# Patient Record
Sex: MALE
[De-identification: names, ages, dates, MRNs, and addresses within clinical notes are randomized per-mention and may not be internally consistent; named-entity substitution may affect disease eponyms.]

---

## 2019-10-27 ENCOUNTER — HOSPITAL (OUTPATIENT)
Dept: OTHER | Age: 64
End: 2019-10-27

## 2019-10-27 PROCEDURE — 99223 1ST HOSP IP/OBS HIGH 75: CPT | Performed by: INTERNAL MEDICINE

## 2019-10-27 PROCEDURE — X1094 NO CHARGE VISIT: HCPCS | Performed by: INTERNAL MEDICINE

## 2019-10-28 PROCEDURE — 99233 SBSQ HOSP IP/OBS HIGH 50: CPT | Performed by: INTERNAL MEDICINE

## 2019-10-29 PROCEDURE — 99233 SBSQ HOSP IP/OBS HIGH 50: CPT | Performed by: INTERNAL MEDICINE

## 2019-10-30 PROCEDURE — 99233 SBSQ HOSP IP/OBS HIGH 50: CPT | Performed by: INTERNAL MEDICINE

## 2019-10-31 PROCEDURE — 99222 1ST HOSP IP/OBS MODERATE 55: CPT | Performed by: FAMILY MEDICINE

## 2019-10-31 PROCEDURE — 99233 SBSQ HOSP IP/OBS HIGH 50: CPT | Performed by: INTERNAL MEDICINE

## 2019-11-01 PROCEDURE — 99232 SBSQ HOSP IP/OBS MODERATE 35: CPT | Performed by: INTERNAL MEDICINE

## 2019-11-01 PROCEDURE — 99497 ADVNCD CARE PLAN 30 MIN: CPT | Performed by: FAMILY MEDICINE

## 2019-11-01 PROCEDURE — 99233 SBSQ HOSP IP/OBS HIGH 50: CPT | Performed by: FAMILY MEDICINE

## 2019-11-02 PROCEDURE — 99232 SBSQ HOSP IP/OBS MODERATE 35: CPT | Performed by: INTERNAL MEDICINE

## 2019-11-03 PROCEDURE — 99232 SBSQ HOSP IP/OBS MODERATE 35: CPT | Performed by: INTERNAL MEDICINE

## 2019-11-04 PROCEDURE — 99232 SBSQ HOSP IP/OBS MODERATE 35: CPT | Performed by: INTERNAL MEDICINE

## 2019-11-04 PROCEDURE — 99233 SBSQ HOSP IP/OBS HIGH 50: CPT | Performed by: FAMILY MEDICINE

## 2019-11-05 PROCEDURE — 99232 SBSQ HOSP IP/OBS MODERATE 35: CPT | Performed by: INTERNAL MEDICINE

## 2019-11-06 PROCEDURE — 99233 SBSQ HOSP IP/OBS HIGH 50: CPT | Performed by: FAMILY MEDICINE

## 2019-11-06 PROCEDURE — 99239 HOSP IP/OBS DSCHRG MGMT >30: CPT | Performed by: INTERNAL MEDICINE

## 2020-04-08 ENCOUNTER — APPOINTMENT (OUTPATIENT)
Dept: GENERAL RADIOLOGY | Facility: HOSPITAL | Age: 65
End: 2020-04-08
Payer: MEDICAID

## 2020-04-08 ENCOUNTER — HOSPITAL ENCOUNTER (EMERGENCY)
Facility: HOSPITAL | Age: 65
Discharge: HOME | End: 2020-04-08
Attending: EMERGENCY MEDICINE
Payer: MEDICAID

## 2020-04-08 VITALS
WEIGHT: 170 LBS | HEART RATE: 65 BPM | RESPIRATION RATE: 17 BRPM | DIASTOLIC BLOOD PRESSURE: 95 MMHG | TEMPERATURE: 99 F | SYSTOLIC BLOOD PRESSURE: 144 MMHG | OXYGEN SATURATION: 96 %

## 2020-04-08 DIAGNOSIS — Z20.822 SUSPECTED COVID-19 VIRUS INFECTION: Primary | ICD-10-CM

## 2020-04-08 PROCEDURE — 85025 COMPLETE CBC W/AUTO DIFF WBC: CPT

## 2020-04-08 PROCEDURE — 80048 BASIC METABOLIC PNL TOTAL CA: CPT | Performed by: EMERGENCY MEDICINE

## 2020-04-08 PROCEDURE — 85025 COMPLETE CBC W/AUTO DIFF WBC: CPT | Performed by: EMERGENCY MEDICINE

## 2020-04-08 PROCEDURE — 99283 EMERGENCY DEPT VISIT LOW MDM: CPT

## 2020-04-08 PROCEDURE — 36415 COLL VENOUS BLD VENIPUNCTURE: CPT

## 2020-04-08 PROCEDURE — 71045 X-RAY EXAM CHEST 1 VIEW: CPT

## 2020-04-08 PROCEDURE — 80048 BASIC METABOLIC PNL TOTAL CA: CPT

## 2020-04-08 RX ORDER — SENNA AND DOCUSATE SODIUM 50; 8.6 MG/1; MG/1
1 TABLET, FILM COATED ORAL DAILY
COMMUNITY

## 2020-04-08 RX ORDER — DONEPEZIL HYDROCHLORIDE 10 MG/1
10 TABLET, FILM COATED ORAL NIGHTLY
COMMUNITY

## 2020-04-08 RX ORDER — AMIODARONE HYDROCHLORIDE 200 MG/1
200 TABLET ORAL DAILY
COMMUNITY

## 2020-04-08 RX ORDER — FAMOTIDINE 20 MG/1
20 TABLET ORAL 2 TIMES DAILY
COMMUNITY

## 2020-04-08 NOTE — ED PROVIDER NOTES
Patient Seen in: [facility] AND [facility] Emergency Department      History   Patient presents with:  Cough/URI    Stated Complaint:     HPI    58 yo male from nursing home with cough. The patient has a h/o dementia and is unable to give much history.  No chest Cardiovascular:      Rate and Rhythm: Normal rate and regular rhythm. Pulmonary:      Effort: Pulmonary effort is normal. No respiratory distress. Lymphadenopathy:      Cervical: No cervical adenopathy. Skin:     General: Skin is warm and dry. Disposition and Plan     Clinical Impression:  Suspected COVID-19 virus infection  (primary encounter diagnosis)    Disposition:  Discharge  4/8/2020  4:52 am    Follow-up:  No follow-up provider specified.       Medications Prescribed:  Dorothea Sidhu

## 2020-04-08 NOTE — ED INITIAL ASSESSMENT (HPI)
Pt presents to ED via [facility] for a cough for several days. Pt is from [facility]. Pt A&O to self which is baseline per medics. Pt has a strong wet cough. Pt does not appear to be in any apparent distress. Pt wearing 2L O2 NC and sating at 98%.  Per [employer]

## 2023-01-01 ENCOUNTER — APPOINTMENT (OUTPATIENT)
Dept: GENERAL RADIOLOGY | Facility: HOSPITAL | Age: 68
End: 2023-01-01
Attending: INTERNAL MEDICINE
Payer: MEDICAID

## 2023-01-01 ENCOUNTER — HOSPITAL ENCOUNTER (INPATIENT)
Facility: HOSPITAL | Age: 68
LOS: 2 days | End: 2023-01-01
Attending: EMERGENCY MEDICINE | Admitting: HOSPITALIST
Payer: MEDICAID

## 2023-01-01 ENCOUNTER — APPOINTMENT (OUTPATIENT)
Dept: GENERAL RADIOLOGY | Facility: HOSPITAL | Age: 68
End: 2023-01-01
Attending: HOSPITALIST
Payer: MEDICAID

## 2023-01-01 ENCOUNTER — APPOINTMENT (OUTPATIENT)
Dept: CT IMAGING | Facility: HOSPITAL | Age: 68
End: 2023-01-01
Attending: EMERGENCY MEDICINE
Payer: MEDICAID

## 2023-01-01 ENCOUNTER — APPOINTMENT (OUTPATIENT)
Dept: NUCLEAR MEDICINE | Facility: HOSPITAL | Age: 68
End: 2023-01-01
Attending: INTERNAL MEDICINE
Payer: MEDICAID

## 2023-01-01 ENCOUNTER — ANESTHESIA (OUTPATIENT)
Dept: CARDIOLOGY UNIT | Facility: HOSPITAL | Age: 68
End: 2023-01-01
Payer: MEDICAID

## 2023-01-01 ENCOUNTER — APPOINTMENT (OUTPATIENT)
Dept: ULTRASOUND IMAGING | Facility: HOSPITAL | Age: 68
End: 2023-01-01
Attending: INTERNAL MEDICINE
Payer: MEDICAID

## 2023-01-01 ENCOUNTER — APPOINTMENT (OUTPATIENT)
Dept: GENERAL RADIOLOGY | Facility: HOSPITAL | Age: 68
End: 2023-01-01
Payer: MEDICAID

## 2023-01-01 ENCOUNTER — APPOINTMENT (OUTPATIENT)
Dept: CT IMAGING | Facility: HOSPITAL | Age: 68
End: 2023-01-01
Attending: HOSPITALIST
Payer: MEDICAID

## 2023-01-01 ENCOUNTER — ANESTHESIA EVENT (OUTPATIENT)
Dept: CARDIOLOGY UNIT | Facility: HOSPITAL | Age: 68
End: 2023-01-01
Payer: MEDICAID

## 2023-01-01 VITALS
SYSTOLIC BLOOD PRESSURE: 76 MMHG | WEIGHT: 166 LBS | RESPIRATION RATE: 18 BRPM | HEIGHT: 69 IN | HEART RATE: 63 BPM | BODY MASS INDEX: 24.59 KG/M2 | DIASTOLIC BLOOD PRESSURE: 65 MMHG | OXYGEN SATURATION: 93 % | TEMPERATURE: 98 F

## 2023-01-01 DIAGNOSIS — R06.02 SOB (SHORTNESS OF BREATH): Primary | ICD-10-CM

## 2023-01-01 LAB
ALBUMIN SERPL-MCNC: 2.7 G/DL (ref 3.4–5)
ALBUMIN SERPL-MCNC: 3.5 G/DL (ref 3.4–5)
ALBUMIN/GLOB SERPL: 0.6 {RATIO} (ref 1–2)
ALBUMIN/GLOB SERPL: 0.6 {RATIO} (ref 1–2)
ALP LIVER SERPL-CCNC: 112 U/L
ALP LIVER SERPL-CCNC: 89 U/L
ALT SERPL-CCNC: 59 U/L
ALT SERPL-CCNC: 81 U/L
ANION GAP SERPL CALC-SCNC: 10 MMOL/L (ref 0–18)
ANION GAP SERPL CALC-SCNC: 4 MMOL/L (ref 0–18)
ANION GAP SERPL CALC-SCNC: 8 MMOL/L (ref 0–18)
AST SERPL-CCNC: 144 U/L (ref 15–37)
AST SERPL-CCNC: 87 U/L (ref 15–37)
ATRIAL RATE: 102 BPM
ATRIAL RATE: 110 BPM
BASE EXCESS BLD CALC-SCNC: -3.2 MMOL/L (ref ?–2)
BASE EXCESS BLD CALC-SCNC: -3.5 MMOL/L (ref ?–2)
BASOPHILS # BLD: 0 X10(3) UL (ref 0–0.2)
BASOPHILS NFR BLD: 0 %
BILIRUB SERPL-MCNC: 0.4 MG/DL (ref 0.1–2)
BILIRUB SERPL-MCNC: 0.6 MG/DL (ref 0.1–2)
BILIRUB UR QL: NEGATIVE
BUN BLD-MCNC: 90 MG/DL (ref 7–18)
BUN BLD-MCNC: 93 MG/DL (ref 7–18)
BUN BLD-MCNC: 97 MG/DL (ref 7–18)
BUN/CREAT SERPL: 18.5 (ref 10–20)
BUN/CREAT SERPL: 32.1 (ref 10–20)
BUN/CREAT SERPL: 46 (ref 10–20)
CALCIUM BLD-MCNC: 8.8 MG/DL (ref 8.5–10.1)
CALCIUM BLD-MCNC: 9 MG/DL (ref 8.5–10.1)
CALCIUM BLD-MCNC: 9.5 MG/DL (ref 8.5–10.1)
CHLORIDE SERPL-SCNC: 104 MMOL/L (ref 98–112)
CHLORIDE SERPL-SCNC: 115 MMOL/L (ref 98–112)
CHLORIDE SERPL-SCNC: 120 MMOL/L (ref 98–112)
CHOLEST SERPL-MCNC: 165 MG/DL (ref ?–200)
CO2 SERPL-SCNC: 21 MMOL/L (ref 21–32)
CO2 SERPL-SCNC: 22 MMOL/L (ref 21–32)
CO2 SERPL-SCNC: 23 MMOL/L (ref 21–32)
COLOR UR: YELLOW
CREAT BLD-MCNC: 2.02 MG/DL
CREAT BLD-MCNC: 3.02 MG/DL
CREAT BLD-MCNC: 4.87 MG/DL
CRP SERPL-MCNC: 12.9 MG/DL (ref ?–0.3)
CRP SERPL-MCNC: 18.5 MG/DL (ref ?–0.3)
D DIMER PPP FEU-MCNC: 13.78 UG/ML FEU (ref ?–0.67)
D DIMER PPP FEU-MCNC: >20 UG/ML FEU (ref ?–0.67)
DEPRECATED HBV CORE AB SER IA-ACNC: 242.9 NG/ML
DEPRECATED HBV CORE AB SER IA-ACNC: 260.2 NG/ML
DEPRECATED RDW RBC AUTO: 58.9 FL (ref 35.1–46.3)
DEPRECATED RDW RBC AUTO: 59.7 FL (ref 35.1–46.3)
DEPRECATED RDW RBC AUTO: 61.5 FL (ref 35.1–46.3)
EOSINOPHIL # BLD: 0 X10(3) UL (ref 0–0.7)
EOSINOPHIL NFR BLD: 0 %
ERYTHROCYTE [DISTWIDTH] IN BLOOD BY AUTOMATED COUNT: 18.6 % (ref 11–15)
ERYTHROCYTE [DISTWIDTH] IN BLOOD BY AUTOMATED COUNT: 18.6 % (ref 11–15)
ERYTHROCYTE [DISTWIDTH] IN BLOOD BY AUTOMATED COUNT: 18.8 % (ref 11–15)
FLUAV + FLUBV RNA SPEC NAA+PROBE: NEGATIVE
FLUAV + FLUBV RNA SPEC NAA+PROBE: NEGATIVE
GFR SERPLBLD BASED ON 1.73 SQ M-ARVRAT: 12 ML/MIN/1.73M2 (ref 60–?)
GFR SERPLBLD BASED ON 1.73 SQ M-ARVRAT: 22 ML/MIN/1.73M2 (ref 60–?)
GFR SERPLBLD BASED ON 1.73 SQ M-ARVRAT: 35 ML/MIN/1.73M2 (ref 60–?)
GLOBULIN PLAS-MCNC: 4.8 G/DL (ref 2.8–4.4)
GLOBULIN PLAS-MCNC: 5.5 G/DL (ref 2.8–4.4)
GLUCOSE BLD-MCNC: 107 MG/DL (ref 70–99)
GLUCOSE BLD-MCNC: 109 MG/DL (ref 70–99)
GLUCOSE BLD-MCNC: 126 MG/DL (ref 70–99)
GLUCOSE BLDC GLUCOMTR-MCNC: 105 MG/DL (ref 70–99)
GLUCOSE BLDC GLUCOMTR-MCNC: 121 MG/DL (ref 70–99)
GLUCOSE BLDC GLUCOMTR-MCNC: 124 MG/DL (ref 70–99)
GLUCOSE BLDC GLUCOMTR-MCNC: 128 MG/DL (ref 70–99)
GLUCOSE BLDC GLUCOMTR-MCNC: 129 MG/DL (ref 70–99)
GLUCOSE BLDC GLUCOMTR-MCNC: 154 MG/DL (ref 70–99)
GLUCOSE BLDC GLUCOMTR-MCNC: 157 MG/DL (ref 70–99)
GLUCOSE BLDC GLUCOMTR-MCNC: 95 MG/DL (ref 70–99)
GLUCOSE UR-MCNC: NORMAL MG/DL
HCO3 BLDA-SCNC: 22 MEQ/L (ref 21–27)
HCO3 BLDA-SCNC: 22.4 MEQ/L (ref 21–27)
HCT VFR BLD AUTO: 34.6 %
HCT VFR BLD AUTO: 37.8 %
HCT VFR BLD AUTO: 43.9 %
HDLC SERPL-MCNC: 49 MG/DL (ref 40–59)
HGB BLD-MCNC: 10.6 G/DL
HGB BLD-MCNC: 11.9 G/DL
HGB BLD-MCNC: 13.6 G/DL
INR BLD: 1.58 (ref 0.85–1.16)
INR BLD: 1.9 (ref 0.85–1.16)
KETONES UR-MCNC: NEGATIVE MG/DL
LACTATE SERPL-SCNC: 2 MMOL/L (ref 0.4–2)
LACTATE SERPL-SCNC: 2.8 MMOL/L (ref 0.4–2)
LACTATE SERPL-SCNC: 4.2 MMOL/L (ref 0.4–2)
LDH SERPL L TO P-CCNC: 291 U/L
LDH SERPL L TO P-CCNC: 317 U/L
LDLC SERPL CALC-MCNC: 104 MG/DL (ref ?–100)
LEUKOCYTE ESTERASE UR QL STRIP.AUTO: 500
LYMPHOCYTES NFR BLD: 0.53 X10(3) UL (ref 1–4)
LYMPHOCYTES NFR BLD: 0.9 X10(3) UL (ref 1–4)
LYMPHOCYTES NFR BLD: 1.2 X10(3) UL (ref 1–4)
LYMPHOCYTES NFR BLD: 10 %
LYMPHOCYTES NFR BLD: 26 %
LYMPHOCYTES NFR BLD: 9 %
MAGNESIUM SERPL-MCNC: 2.3 MG/DL (ref 1.6–2.6)
MCH RBC QN AUTO: 27.3 PG (ref 26–34)
MCH RBC QN AUTO: 27.3 PG (ref 26–34)
MCH RBC QN AUTO: 27.4 PG (ref 26–34)
MCHC RBC AUTO-ENTMCNC: 30.6 G/DL (ref 31–37)
MCHC RBC AUTO-ENTMCNC: 31 G/DL (ref 31–37)
MCHC RBC AUTO-ENTMCNC: 31.5 G/DL (ref 31–37)
MCV RBC AUTO: 87.1 FL
MCV RBC AUTO: 88.2 FL
MCV RBC AUTO: 89.2 FL
METAMYELOCYTES # BLD: 0.05 X10(3) UL
METAMYELOCYTES # BLD: 0.18 X10(3) UL
METAMYELOCYTES # BLD: 0.99 X10(3) UL
METAMYELOCYTES NFR BLD: 1 %
METAMYELOCYTES NFR BLD: 11 %
METAMYELOCYTES NFR BLD: 3 %
MODIFIED ALLEN TEST: POSITIVE
MONOCYTES # BLD: 0.18 X10(3) UL (ref 0.1–1)
MONOCYTES # BLD: 0.24 X10(3) UL (ref 0.1–1)
MONOCYTES # BLD: 0.45 X10(3) UL (ref 0.1–1)
MONOCYTES NFR BLD: 4 %
MONOCYTES NFR BLD: 4 %
MONOCYTES NFR BLD: 5 %
MRSA DNA SPEC QL NAA+PROBE: NEGATIVE
MYELOCYTES # BLD: 0.09 X10(3) UL
MYELOCYTES NFR BLD: 1 %
NEUTROPHILS # BLD AUTO: 3.36 X10 (3) UL (ref 1.5–7.7)
NEUTROPHILS # BLD AUTO: 4.3 X10 (3) UL (ref 1.5–7.7)
NEUTROPHILS # BLD AUTO: 7.11 X10 (3) UL (ref 1.5–7.7)
NEUTROPHILS NFR BLD: 40 %
NEUTROPHILS NFR BLD: 49 %
NEUTROPHILS NFR BLD: 52 %
NEUTS BAND NFR BLD: 17 %
NEUTS BAND NFR BLD: 33 %
NEUTS BAND NFR BLD: 35 %
NEUTS HYPERSEG # BLD: 3.17 X10(3) UL (ref 1.5–7.7)
NEUTS HYPERSEG # BLD: 4.96 X10(3) UL (ref 1.5–7.7)
NEUTS HYPERSEG # BLD: 6.57 X10(3) UL (ref 1.5–7.7)
NITRITE UR QL STRIP.AUTO: NEGATIVE
NONHDLC SERPL-MCNC: 116 MG/DL (ref ?–130)
NRBC BLD MANUAL-RTO: 1 %
NRBC BLD MANUAL-RTO: 2 %
NRBC BLD MANUAL-RTO: 7 %
NT-PROBNP SERPL-MCNC: 2241 PG/ML (ref ?–125)
O2 CT BLD-SCNC: 14.5 VOL% (ref 15–23)
O2 CT BLD-SCNC: 9.4 VOL% (ref 15–23)
O2/TOTAL GAS SETTING VFR VENT: 100 %
OSMOLALITY SERPL CALC.SUM OF ELEC: 313 MOSM/KG (ref 275–295)
OSMOLALITY SERPL CALC.SUM OF ELEC: 329 MOSM/KG (ref 275–295)
OSMOLALITY SERPL CALC.SUM OF ELEC: 331 MOSM/KG (ref 275–295)
P AXIS: 23 DEGREES
P AXIS: 41 DEGREES
P-R INTERVAL: 182 MS
P-R INTERVAL: 216 MS
PCO2 BLDA: 37 MM HG (ref 35–45)
PCO2 BLDA: 40 MM HG (ref 35–45)
PH BLDA: 7.35 [PH] (ref 7.35–7.45)
PH BLDA: 7.37 [PH] (ref 7.35–7.45)
PH UR: 5.5 [PH] (ref 5–8)
PLATELET # BLD AUTO: 258 10(3)UL (ref 150–450)
PLATELET # BLD AUTO: 263 10(3)UL (ref 150–450)
PLATELET # BLD AUTO: 269 10(3)UL (ref 150–450)
PLATELET MORPHOLOGY: NORMAL
PO2 BLDA: 63 MM HG (ref 80–100)
PO2 BLDA: 71 MM HG (ref 80–100)
POTASSIUM SERPL-SCNC: 4.6 MMOL/L (ref 3.5–5.1)
POTASSIUM SERPL-SCNC: 5 MMOL/L (ref 3.5–5.1)
POTASSIUM SERPL-SCNC: 5.7 MMOL/L (ref 3.5–5.1)
PROT SERPL-MCNC: 7.5 G/DL (ref 6.4–8.2)
PROT SERPL-MCNC: 9 G/DL (ref 6.4–8.2)
PROT UR-MCNC: 70 MG/DL
PROTHROMBIN TIME: 18.8 SECONDS (ref 11.6–14.8)
PROTHROMBIN TIME: 21.6 SECONDS (ref 11.6–14.8)
PUNCTURE CHARGE: YES
PUNCTURE CHARGE: YES
Q-T INTERVAL: 348 MS
Q-T INTERVAL: 360 MS
QRS DURATION: 102 MS
QRS DURATION: 110 MS
QTC CALCULATION (BEZET): 469 MS
QTC CALCULATION (BEZET): 470 MS
R AXIS: 249 DEGREES
R AXIS: 255 DEGREES
RBC # BLD AUTO: 3.88 X10(6)UL
RBC # BLD AUTO: 4.34 X10(6)UL
RBC # BLD AUTO: 4.98 X10(6)UL
RESP RATE: 20 BPM
RSV RNA SPEC NAA+PROBE: NEGATIVE
SAO2 % BLDA: 92.4 % (ref 94–100)
SAO2 % BLDA: 96.8 % (ref 94–100)
SARS-COV-2 RNA RESP QL NAA+PROBE: DETECTED
SODIUM SERPL-SCNC: 137 MMOL/L (ref 136–145)
SODIUM SERPL-SCNC: 144 MMOL/L (ref 136–145)
SODIUM SERPL-SCNC: 146 MMOL/L (ref 136–145)
SP GR UR STRIP: 1.02 (ref 1–1.03)
SPECIMEN VOL 24H UR: 500 ML
T AXIS: 37 DEGREES
T AXIS: 56 DEGREES
TOTAL CELLS COUNTED BLD: 100
TRIGL SERPL-MCNC: 63 MG/DL (ref 30–149)
TROPONIN I HIGH SENSITIVITY: 34 NG/L
UROBILINOGEN UR STRIP-ACNC: 2
VENTRICULAR RATE: 102 BPM
VENTRICULAR RATE: 110 BPM
VLDLC SERPL CALC-MCNC: 11 MG/DL (ref 0–30)
WBC # BLD AUTO: 4.6 X10(3) UL (ref 4–11)
WBC # BLD AUTO: 5.9 X10(3) UL (ref 4–11)
WBC # BLD AUTO: 9 X10(3) UL (ref 4–11)

## 2023-01-01 PROCEDURE — 99291 CRITICAL CARE FIRST HOUR: CPT | Performed by: HOSPITALIST

## 2023-01-01 PROCEDURE — 93970 EXTREMITY STUDY: CPT | Performed by: INTERNAL MEDICINE

## 2023-01-01 PROCEDURE — 71045 X-RAY EXAM CHEST 1 VIEW: CPT | Performed by: HOSPITALIST

## 2023-01-01 PROCEDURE — 99223 1ST HOSP IP/OBS HIGH 75: CPT | Performed by: INTERNAL MEDICINE

## 2023-01-01 PROCEDURE — 3E0333Z INTRODUCTION OF ANTI-INFLAMMATORY INTO PERIPHERAL VEIN, PERCUTANEOUS APPROACH: ICD-10-PCS | Performed by: INTERNAL MEDICINE

## 2023-01-01 PROCEDURE — 5A1935Z RESPIRATORY VENTILATION, LESS THAN 24 CONSECUTIVE HOURS: ICD-10-PCS | Performed by: INTERNAL MEDICINE

## 2023-01-01 PROCEDURE — 5A09357 ASSISTANCE WITH RESPIRATORY VENTILATION, LESS THAN 24 CONSECUTIVE HOURS, CONTINUOUS POSITIVE AIRWAY PRESSURE: ICD-10-PCS | Performed by: EMERGENCY MEDICINE

## 2023-01-01 PROCEDURE — XW033E5 INTRODUCTION OF REMDESIVIR ANTI-INFECTIVE INTO PERIPHERAL VEIN, PERCUTANEOUS APPROACH, NEW TECHNOLOGY GROUP 5: ICD-10-PCS | Performed by: INTERNAL MEDICINE

## 2023-01-01 PROCEDURE — 71045 X-RAY EXAM CHEST 1 VIEW: CPT | Performed by: EMERGENCY MEDICINE

## 2023-01-01 PROCEDURE — 74176 CT ABD & PELVIS W/O CONTRAST: CPT | Performed by: HOSPITALIST

## 2023-01-01 PROCEDURE — 0D9670Z DRAINAGE OF STOMACH WITH DRAINAGE DEVICE, VIA NATURAL OR ARTIFICIAL OPENING: ICD-10-PCS | Performed by: HOSPITALIST

## 2023-01-01 PROCEDURE — 70450 CT HEAD/BRAIN W/O DYE: CPT | Performed by: EMERGENCY MEDICINE

## 2023-01-01 PROCEDURE — 99233 SBSQ HOSP IP/OBS HIGH 50: CPT | Performed by: HOSPITALIST

## 2023-01-01 PROCEDURE — 78580 LUNG PERFUSION IMAGING: CPT | Performed by: INTERNAL MEDICINE

## 2023-01-01 PROCEDURE — 99253 IP/OBS CNSLTJ NEW/EST LOW 45: CPT | Performed by: INTERNAL MEDICINE

## 2023-01-01 PROCEDURE — 0BH17EZ INSERTION OF ENDOTRACHEAL AIRWAY INTO TRACHEA, VIA NATURAL OR ARTIFICIAL OPENING: ICD-10-PCS | Performed by: ANESTHESIOLOGY

## 2023-01-01 RX ORDER — DEXAMETHASONE SODIUM PHOSPHATE 4 MG/ML
6 VIAL (ML) INJECTION
Status: DISCONTINUED | OUTPATIENT
Start: 2023-01-01 | End: 2023-01-01

## 2023-01-01 RX ORDER — ACETAMINOPHEN 650 MG/1
650 SUPPOSITORY RECTAL ONCE
Status: COMPLETED | OUTPATIENT
Start: 2023-01-01 | End: 2023-01-01

## 2023-01-01 RX ORDER — DEXTROSE MONOHYDRATE 25 G/50ML
50 INJECTION, SOLUTION INTRAVENOUS
Status: DISCONTINUED | OUTPATIENT
Start: 2023-01-01 | End: 2023-01-01

## 2023-01-01 RX ORDER — MORPHINE SULFATE 2 MG/ML
1 INJECTION, SOLUTION INTRAMUSCULAR; INTRAVENOUS EVERY 2 HOUR PRN
Status: DISCONTINUED | OUTPATIENT
Start: 2023-01-01 | End: 2023-01-01

## 2023-01-01 RX ORDER — SODIUM CHLORIDE, SODIUM LACTATE, POTASSIUM CHLORIDE, CALCIUM CHLORIDE 600; 310; 30; 20 MG/100ML; MG/100ML; MG/100ML; MG/100ML
INJECTION, SOLUTION INTRAVENOUS CONTINUOUS
Status: DISCONTINUED | OUTPATIENT
Start: 2023-01-01 | End: 2023-01-01

## 2023-01-01 RX ORDER — ACETAMINOPHEN 500 MG
500 TABLET ORAL EVERY 4 HOURS PRN
Status: DISCONTINUED | OUTPATIENT
Start: 2023-01-01 | End: 2023-01-01

## 2023-01-01 RX ORDER — HEPARIN SODIUM 5000 [USP'U]/ML
5000 INJECTION, SOLUTION INTRAVENOUS; SUBCUTANEOUS EVERY 12 HOURS SCHEDULED
Status: DISCONTINUED | OUTPATIENT
Start: 2023-01-01 | End: 2023-01-01

## 2023-01-01 RX ORDER — DONEPEZIL HYDROCHLORIDE 10 MG/1
10 TABLET, FILM COATED ORAL NIGHTLY
Status: DISCONTINUED | OUTPATIENT
Start: 2023-01-01 | End: 2023-01-01

## 2023-01-01 RX ORDER — NICOTINE POLACRILEX 4 MG
30 LOZENGE BUCCAL
Status: DISCONTINUED | OUTPATIENT
Start: 2023-01-01 | End: 2023-01-01

## 2023-01-01 RX ORDER — METHYLPREDNISOLONE SODIUM SUCCINATE 125 MG/2ML
125 INJECTION, POWDER, LYOPHILIZED, FOR SOLUTION INTRAMUSCULAR; INTRAVENOUS ONCE
Status: COMPLETED | OUTPATIENT
Start: 2023-01-01 | End: 2023-01-01

## 2023-01-01 RX ORDER — SENNA AND DOCUSATE SODIUM 50; 8.6 MG/1; MG/1
1 TABLET, FILM COATED ORAL DAILY
Status: DISCONTINUED | OUTPATIENT
Start: 2023-01-01 | End: 2023-01-01

## 2023-01-01 RX ORDER — PANTOPRAZOLE SODIUM 40 MG/1
40 TABLET, DELAYED RELEASE ORAL
Status: DISCONTINUED | OUTPATIENT
Start: 2023-01-01 | End: 2023-01-01

## 2023-01-01 RX ORDER — AMIODARONE HYDROCHLORIDE 200 MG/1
200 TABLET ORAL DAILY
Status: DISCONTINUED | OUTPATIENT
Start: 2023-01-01 | End: 2023-01-01

## 2023-01-01 RX ORDER — METOPROLOL TARTRATE 5 MG/5ML
5 INJECTION INTRAVENOUS EVERY 6 HOURS
Status: DISCONTINUED | OUTPATIENT
Start: 2023-01-01 | End: 2023-01-01

## 2023-01-01 RX ORDER — LORAZEPAM 2 MG/ML
0.5 INJECTION INTRAMUSCULAR EVERY 6 HOURS PRN
Status: DISCONTINUED | OUTPATIENT
Start: 2023-01-01 | End: 2023-01-01

## 2023-01-01 RX ORDER — MORPHINE SULFATE 4 MG/ML
4 INJECTION, SOLUTION INTRAMUSCULAR; INTRAVENOUS EVERY 2 HOUR PRN
Status: DISCONTINUED | OUTPATIENT
Start: 2023-01-01 | End: 2023-01-01

## 2023-01-01 RX ORDER — MORPHINE SULFATE 2 MG/ML
2 INJECTION, SOLUTION INTRAMUSCULAR; INTRAVENOUS EVERY 2 HOUR PRN
Status: DISCONTINUED | OUTPATIENT
Start: 2023-01-01 | End: 2023-01-01

## 2023-01-01 RX ORDER — NICOTINE POLACRILEX 4 MG
15 LOZENGE BUCCAL
Status: DISCONTINUED | OUTPATIENT
Start: 2023-01-01 | End: 2023-01-01

## 2023-01-01 RX ORDER — ONDANSETRON 2 MG/ML
4 INJECTION INTRAMUSCULAR; INTRAVENOUS EVERY 6 HOURS PRN
Status: DISCONTINUED | OUTPATIENT
Start: 2023-01-01 | End: 2023-01-01

## 2023-01-01 RX ORDER — DEXAMETHASONE 6 MG/1
6 TABLET ORAL
Status: DISCONTINUED | OUTPATIENT
Start: 2023-01-01 | End: 2023-01-01

## 2023-03-14 ENCOUNTER — HOSPITAL ENCOUNTER (EMERGENCY)
Facility: HOSPITAL | Age: 68
Discharge: HOME | End: 2023-03-14
Attending: EMERGENCY MEDICINE
Payer: MEDICAID

## 2023-03-14 VITALS
RESPIRATION RATE: 18 BRPM | HEART RATE: 52 BPM | TEMPERATURE: 98 F | SYSTOLIC BLOOD PRESSURE: 131 MMHG | WEIGHT: 200 LBS | DIASTOLIC BLOOD PRESSURE: 74 MMHG | OXYGEN SATURATION: 100 %

## 2023-03-14 DIAGNOSIS — R04.0 EPISTAXIS: Primary | ICD-10-CM

## 2023-03-14 PROCEDURE — 30903 CONTROL OF NOSEBLEED: CPT

## 2023-03-14 PROCEDURE — 99283 EMERGENCY DEPT VISIT LOW MDM: CPT

## 2023-03-14 PROCEDURE — 99285 EMERGENCY DEPT VISIT HI MDM: CPT

## 2023-03-14 NOTE — ED INITIAL ASSESSMENT (HPI)
Pt presents to ED via EMS from [address] [place] [address] for a nose bleed from 6a-8a. Pt on 2.5mg of eliquis. Bleeding has stopped now. Pt A&O x1 which is baseline per ems.

## 2023-03-15 ENCOUNTER — TELEPHONE (OUTPATIENT)
Dept: OTOLARYNGOLOGY | Facility: CLINIC | Age: 68
End: 2023-03-15

## 2023-03-15 NOTE — TELEPHONE ENCOUNTER
Ashish Zamorano from [facility] requesting appointment for packing removal from ER visit yesterday. Patient is wheel chair bound. Please call Ashish Zamorano back at [phone] and asked to be transferred to UNIT 2.  Please advise

## 2023-03-15 NOTE — TELEPHONE ENCOUNTER
Spoke with will Francesca Spears advised that packing will need to be in place for 5 days, pt scheduled for packing removal- advised will need escort with pt at all times. Pt will be coming in wheelchair.  Patient scheduled with

## 2023-03-20 ENCOUNTER — OFFICE VISIT (OUTPATIENT)
Dept: OTOLARYNGOLOGY | Facility: CLINIC | Age: 68
End: 2023-03-20

## 2023-03-20 VITALS — TEMPERATURE: 97 F

## 2023-03-20 DIAGNOSIS — R04.0 EPISTAXIS: Primary | ICD-10-CM

## 2023-03-20 DIAGNOSIS — J34.2 NASAL SEPTAL DEVIATION: ICD-10-CM

## 2023-03-20 NOTE — PATIENT INSTRUCTIONS
No packing in either nares on the day of your visit. You have an incidental right septal deviation. No bleeding on either side. Follow-up with any additional questions or problems.

## 2023-05-20 PROBLEM — R06.02 SOB (SHORTNESS OF BREATH): Status: ACTIVE | Noted: 2023-01-01

## 2023-05-20 NOTE — PLAN OF CARE
A&Ox1. Complete care with right side weakness from previous CVA. On 1L NC. Incontinent X2. Admission complete. NPO until speech evals. Problem: Patient Centered Care  Goal: Patient preferences are identified and integrated in the patient's plan of care  Description: Interventions:  - What would you like us to know as we care for you?  From [facility]   - Provide timely, complete, and accurate information to patient/family  - Incorporate patient and family knowledge, values, beliefs, and cultural backgrounds into the planning and delivery of care  - Encourage patient/family to participate in care and decision-making at the level they choose  - Honor patient and family perspectives and choices  Outcome: Not Progressing     Problem: Patient/Family Goals  Goal: Patient/Family Long Term Goal  Description: Patient's Long Term Goal: return to [facility]     Interventions:  - follow plan of care  - See additional Care Plan goals for specific interventions  Outcome: Not Progressing  Goal: Patient/Family Short Term Goal  Description: Patient's Short Term Goal: breath better    Interventions:   - couth and deep breath   - See additional Care Plan goals for specific interventions  Outcome: Not Progressing     Problem: RESPIRATORY - ADULT  Goal: Achieves optimal ventilation and oxygenation  Description: INTERVENTIONS:  - Assess for changes in respiratory status  - Assess for changes in mentation and behavior  - Position to facilitate oxygenation and minimize respiratory effort  - Oxygen supplementation based on oxygen saturation or ABGs  - Provide Smoking Cessation handout, if applicable  - Encourage broncho-pulmonary hygiene including cough, deep breathe, Incentive Spirometry  - Assess the need for suctioning and perform as needed  - Assess and instruct to report SOB or any respiratory difficulty  - Respiratory Therapy support as indicated  - Manage/alleviate anxiety  - Monitor for signs/symptoms of CO2 retention  Outcome: Not Progressing     Problem: Impaired Swallowing  Goal: Minimize aspiration risk  Description: Interventions:  - Patient should be alert and upright for all feedings (90 degrees preferred)  - Offer food and liquids at a slow rate  - No straws  - Encourage small bites of food and small sips of liquid  - Offer pills one at a time, crush or deliver with applesauce as needed  - Discontinue feeding and notify MD (or speech pathologist) if coughing or persistent throat clearing or wet/gurgly vocal quality is noted  Outcome: Not Progressing

## 2023-05-20 NOTE — PROGRESS NOTES
[facility] Pharmacy Note: Antimicrobial Weight Based Dose Adjustment for: cefepime (MAXIPIME)    Hernandez Boothe is a 79year old patient who has been prescribed cefepime (MAXIPIME) 2000 mg x 1 dose. CrCl cannot be calculated (Unknown ideal weight.). Wt Readings from Last 6 Encounters:  05/20/23 : 77.1 kg (170 lb)  03/14/23 : 90.7 kg (200 lb)  04/08/20 : 77.1 kg (170 lb)    There is no height or weight on file to calculate BMI. Based on this criteria and renal function, dose will be adjusted to cefepime (MAXIPIME) 1000 mg x 1 dose.     Thank you,    Clarissa Iqbal, PharmD  5/20/2023  11:48 AM

## 2023-05-20 NOTE — ED INITIAL ASSESSMENT (HPI)
Via [facility] From [facility], called for sob and hypoxia , medics found patient with pulse ox 80s. Unknown if patient wears 02 at nursing home.  Hx of stroke, at mental baseline  Patient with obvious large abdominal hernia

## 2023-05-21 NOTE — PROGRESS NOTES
05/21/23 0800   VISIT TYPE   OT Inpatient Visit Type (Documentation Required) Attempted Evaluation   OT Follow Up   OT Follow-up Date 05/22/23     Orders received and chart reviewed. OT spoke w/ RN who stated pt is lethargic and unable to follow commands. Will cont to follow.

## 2023-05-21 NOTE — PROGRESS NOTES
[facility] Pharmacy Note:  Renal Adjustment for cefepime (MAXIPIME)    Teresa Prince is a 79year old patient who has been prescribed cefepime (MAXIPIME) 1 g every 8 hrs. The estimated creatinine clearance is 14.7 mL/min (A) (based on SCr of 4.87 mg/dL (H)). The dose has been adjusted to cefepime (MAXIPIME) 1 g every 24 hrs per hospital renal dose adjustment protocol for treatment of pneumonia. Pharmacy will follow and adjust dose as warranted for additional renal function changes.     Thank you,    Sami Foster, PharmD  5/20/2023  9:00 PM

## 2023-05-21 NOTE — PLAN OF CARE
Patient is oriented to self, follows commands and able to answer simple yes/no questions. No acute distress, denies pain. Weaned down to room air. Tachycardic in the 100-110s. NPO, accuchecks q6hr. Had x1 emesis, brown bile, PRN zofran ordered and administered after. Primofit intact. Max assist, PT/OT/ST to see. IV remdesivir. IV abx. IV fluids.     Problem: RESPIRATORY - ADULT  Goal: Achieves optimal ventilation and oxygenation  Description: INTERVENTIONS:  - Assess for changes in respiratory status  - Assess for changes in mentation and behavior  - Position to facilitate oxygenation and minimize respiratory effort  - Oxygen supplementation based on oxygen saturation or ABGs  - Provide Smoking Cessation handout, if applicable  - Encourage broncho-pulmonary hygiene including cough, deep breathe, Incentive Spirometry  - Assess the need for suctioning and perform as needed  - Assess and instruct to report SOB or any respiratory difficulty  - Respiratory Therapy support as indicated  - Manage/alleviate anxiety  - Monitor for signs/symptoms of CO2 retention  Outcome: Progressing     Problem: Impaired Swallowing  Goal: Minimize aspiration risk  Description: Interventions:  - Patient should be alert and upright for all feedings (90 degrees preferred)  - Offer food and liquids at a slow rate  - No straws  - Encourage small bites of food and small sips of liquid  - Offer pills one at a time, crush or deliver with applesauce as needed  - Discontinue feeding and notify MD (or speech pathologist) if coughing or persistent throat clearing or wet/gurgly vocal quality is noted  Outcome: Progressing     Problem: CARDIOVASCULAR - ADULT  Goal: Absence of cardiac arrhythmias or at baseline  Description: INTERVENTIONS:  - Continuous cardiac monitoring, monitor vital signs, obtain 12 lead EKG if indicated  - Evaluate effectiveness of antiarrhythmic and heart rate control medications as ordered  - Initiate emergency measures for life threatening arrhythmias  - Monitor electrolytes and administer replacement therapy as ordered  Outcome: Progressing

## 2023-05-21 NOTE — PLAN OF CARE
Pureed food and thickened liquids. Meds crushed in apple sauce. US of VERONICA LE performed. Plan for VQ vent and CT of abdomen/pelvis to d/t bacteremia. Problem: Patient Centered Care  Goal: Patient preferences are identified and integrated in the patient's plan of care  Description: Interventions:  - What would you like us to know as we care for you?  From [facility]   - Provide timely, complete, and accurate information to patient/family  - Incorporate patient and family knowledge, values, beliefs, and cultural backgrounds into the planning and delivery of care  - Encourage patient/family to participate in care and decision-making at the level they choose  - Honor patient and family perspectives and choices  Outcome: Progressing     Problem: Patient/Family Goals  Goal: Patient/Family Long Term Goal  Description: Patient's Long Term Goal: return to [facility]     Interventions:  - follow plan of care  - See additional Care Plan goals for specific interventions  Outcome: Progressing  Goal: Patient/Family Short Term Goal  Description: Patient's Short Term Goal: breath better    Interventions:   - couth and deep breath   - See additional Care Plan goals for specific interventions  Outcome: Progressing     Problem: RESPIRATORY - ADULT  Goal: Achieves optimal ventilation and oxygenation  Description: INTERVENTIONS:  - Assess for changes in respiratory status  - Assess for changes in mentation and behavior  - Position to facilitate oxygenation and minimize respiratory effort  - Oxygen supplementation based on oxygen saturation or ABGs  - Provide Smoking Cessation handout, if applicable  - Encourage broncho-pulmonary hygiene including cough, deep breathe, Incentive Spirometry  - Assess the need for suctioning and perform as needed  - Assess and instruct to report SOB or any respiratory difficulty  - Respiratory Therapy support as indicated  - Manage/alleviate anxiety  - Monitor for signs/symptoms of CO2 retention  Outcome: Progressing     Problem: Impaired Swallowing  Goal: Minimize aspiration risk  Description: Interventions:  - Patient should be alert and upright for all feedings (90 degrees preferred)  - Offer food and liquids at a slow rate  - No straws  - Encourage small bites of food and small sips of liquid  - Offer pills one at a time, crush or deliver with applesauce as needed  - Discontinue feeding and notify MD (or speech pathologist) if coughing or persistent throat clearing or wet/gurgly vocal quality is noted  Outcome: Progressing     Problem: CARDIOVASCULAR - ADULT  Goal: Absence of cardiac arrhythmias or at baseline  Description: INTERVENTIONS:  - Continuous cardiac monitoring, monitor vital signs, obtain 12 lead EKG if indicated  - Evaluate effectiveness of antiarrhythmic and heart rate control medications as ordered  - Initiate emergency measures for life threatening arrhythmias  - Monitor electrolytes and administer replacement therapy as ordered  Outcome: Progressing

## 2023-05-22 NOTE — OCCUPATIONAL THERAPY NOTE
05/22/23 5262   VISIT TYPE   OT Inpatient Visit Type (Documentation Required) Attempted Evaluation  (RN in room with pt completing pt care.  This OT will re-attempt later in am.)   OT Follow Up   Follow Up Needed (Documentation Required) Yes   OT Follow-up Date 05/22/23         Teresa Tucker OTR/L  Occupational Therapy  [place] [id number]

## 2023-05-22 NOTE — PROGRESS NOTES
05/22/23 1100   VISIT TYPE   SLP Inpatient Visit Type (Documentation Required) Attempted Treatment     Attempted to see pt for dysphagia therapy services. Code blue called on pt. Pt now intubated and not able to participate in PO trials. Pt will be placed on HOLD from SLP therapy services at this time. Please place new consult as pt appropriate and medically able to participate in a reassessment.     Anel Holloway MA, CCC-SLP  Speech and Language Pathologist  #[id number]

## 2023-05-22 NOTE — ANESTHESIA PROCEDURE NOTES
Airway  Date/Time: 5/22/2023 11:05 AM  Urgency: emergent    Airway not difficult    General Information and Staff    Patient location during procedure: floor  Anesthesiologist: Curlene Castleman, MD  Performed: anesthesiologist   Performed by: Curlene Castleman, MD  Authorized by: Curlene Castleman, MD      Consent for Airway (if performed for an anesthetic, see related documentation for consents)  Patient identity confirmed: arm band  Consent: The procedure was performed in an emergent situation. Verbal consent not obtained. Written consent not obtained.   Risks and benefits: risks, benefits and alternatives were not discussed      Indications and Patient Condition  Indications for airway management: cardio/pulmonary arrest  Spontaneous Ventilation: absent  Sedation level: no sedation  Preoxygenated: yes  Patient position: sniffing  Mask difficulty assessment: 1 - vent by mask    Final Airway Details  Final airway type: endotracheal airway      Successful airway: ETT  Cuffed: yes   Successful intubation technique: Video laryngoscopy  Endotracheal tube insertion site: oral  Blade: GlideScope  Blade size: #3  ETT size (mm): 8.0    Cormack-Lehane Classification: grade I - full view of glottis  Placement verified by: chest auscultation and capnometry   Cuff volume (mL): 15  Measured from: teeth  ETT to teeth (cm): 22  Number of attempts at approach: 1  Number of other approaches attempted: 0

## 2023-05-22 NOTE — PROGRESS NOTES
Palliative Brief Note    I went to see patient again during code blue event. There was return of pulse but shortly after, pulses lost again. (see full documentation to follow). Nursing supervisor was in contact with Daniel Rodriguez (brother, main decision-maker surrogate) and informed him that they were at the limit of what could be done medically to realistically bring Maggie Hardings back and would now allow for Maggie Abrahambes to pass peacefully. Daniel Rodriguez was understanding of this. Emotional and spiritual support offered. I will follow up with them.     [address] [facility] [phone]

## 2023-05-22 NOTE — PROGRESS NOTES
Responded to code blue. Patient conversant this morning, then lost consciousness. Had rhythm but no pulse. CPR, 4 rounds of epi, 2 bicarb, atropine. Intubated with gastric secretions suctioned from airway. High dose dopamine, continue ventilation. Pulse returned. Will continue dopamine. Transfer to ICU. ABG, labs pending. Suspect aspiration, metabolic derangement as cause of arrest.  Large AAA known. Prognosis remains poor. Transfer to ICU. [facility] 45 min.

## 2023-05-22 NOTE — CM/SW NOTE
10: 40AM  SW self referred pt for DC planning after RN rounds report that pt is from LTC. Per chart, pt's address is for [facility]. SW consulted w/ janine Gonzalez and confirmed pt is LTC resident at [facility]. Per SW's request, Melita Gonzalez will confirm pt's POA HC and update SW. She will also attempt to email copies of POA to SW when able. SW attempted pt's contact Filomena Carlson for further questions and confirm relationship - no answer. SW left VMail requesting call back at his convenience. Clinicals sent to [facility] via [facility]. Per RN rounds this AM, pt is AO to self. Pt w/ NG tube at intermittent suction. Plan for surgery and palliative care consults. 01:45PM  Per janine Gonzalez w/ [facility] - pt's POA [facility], [facility]. is brother Andreina Dalias. RN/Kiana, BAILEY Uribe, and Dr. Roel Lund provided update. Liaison is going to attempt and fax documents to SW for pt's record. PLAN: [address], needs Ambulance at DC, PCS completed (needs date) - pending clinical course & med clear      SW/CM to remain available for support and/or discharge planning.          Ignacio Adame, MSW, [address] [facility]

## 2023-05-22 NOTE — PLAN OF CARE
Patient is Aox2, normally follows commands well despite minimal verbal response. Became combative with NGT insertion, pulling tubes; restraints applied. NGT to LIS, @ 55cm. No acute distress, no apparent pain. On room air. Tachycardiac and hypertensive, IV metoprolol q6hr scheduled. X2 assist to turn. NPO. Accuchecks q6hr. Incontinent. Primofit maintained. IVP protonix. IV abx for bacteremia, ID consulted. IV fluids running. General surgery and vascular surgery consulted for CT A/P results of SBO and chronic AAA. Apply abdominal binder if able, per attending.      Problem: GASTROINTESTINAL - ADULT  Goal: Minimal or absence of nausea and vomiting  Description: INTERVENTIONS:  - Maintain adequate hydration with IV or PO as ordered and tolerated  - Nasogastric tube to low intermittent suction as ordered  - Evaluate effectiveness of ordered antiemetic medications  - Provide nonpharmacologic comfort measures as appropriate  - Advance diet as tolerated, if ordered  - Obtain nutritional consult as needed  - Evaluate fluid balance  Outcome: Progressing     Problem: GASTROINTESTINAL - ADULT  Goal: Maintains or returns to baseline bowel function  Description: INTERVENTIONS:  - Assess bowel function  - Maintain adequate hydration with IV or PO as ordered and tolerated  - Evaluate effectiveness of GI medications  - Encourage mobilization and activity  - Obtain nutritional consult as needed  - Establish a toileting routine/schedule  - Consider collaborating with pharmacy to review patient's medication profile  Outcome: Progressing     Problem: Safety Risk - Non-Violent Restraints  Goal: Patient will remain free from self-harm  Description: INTERVENTIONS:  - Apply the least restrictive restraint to prevent harm  - Notify patient and family of reasons restraints applied  - Assess for any contributing factors to confusion (electrolyte disturbances, delirium, medications)  - Discontinue any unnecessary medical devices as soon as possible  - Assess the patient's physical comfort, circulation, skin condition, hydration, nutrition and elimination needs   - Reorient and redirection as needed  - Assess for the need to continue restraints  5/22/2023 0638 by Roosevelt Cohn RN  Outcome: Progressing

## 2023-05-22 NOTE — SIGNIFICANT EVENT
CT A/P indicates SBO with transition point in ventral hernia. He vomited x1 this morning but was actually doing ok throughout the day and ate earlier. It also shows large infrarenal AAA 7.5cm. CT from [facility] report indicates it was similar diameter in 9/2021. SBO   -consult general surgery - d/w Dr. Carmella Cruz  -can attempt to reduce and place abd binder  -NPO  -NGT    AAA  -chronic. Surgery was recommended at [facility] as far back as 2020, but he is out of network and was told to follow up elsewhere. I have no record to indicate he ever did follow up and his brother doesn't know.    -consult vascular surgery - d/w Dr. Barnet Leyden  -deferring CTA due to ERNIE Zeng MD

## 2023-05-22 NOTE — PROGRESS NOTES
Code Blue    *See Code Blue Documentation Record*    Reason the code blue was called: patient with change in mental status and no pulse, code called at 1053  Assessment of patient leading up to code: receiving bath by PCT, closing eyes and turning head  Interventions/Testing: VQ scan at 0730  Patient Outcome/Disposition: time of death called at [address]  Family Notified: yes  Name of family notified: Micheal Pabon

## 2023-05-23 LAB
BLACTX-M ISLT/SPM QL: NOT DETECTED
E COLI DNA BLD POS QL NAA+NON-PROBE: DETECTED
ENTEROCOCCUS FAECALIS NOT VRE: DETECTED
STAPHYLOCOCCUS SPECIES NOT AUREUS, EPIDERMIDIS, OR LUGDUNENSIS: DETECTED
STREPTOCOCCUS DNA BLD POS NAA+NON-PROBE: DETECTED

## 2023-05-30 ENCOUNTER — TELEPHONE (OUTPATIENT)
Dept: INTERNAL MEDICINE UNIT | Facility: HOSPITAL | Age: 68
End: 2023-05-30

## 2023-05-30 NOTE — TELEPHONE ENCOUNTER
Death certificate completed and signed by Hospitalist MD Dr. Keith Hernandez. Submitted via fax to [phone]. Confirmation fax received.